# Patient Record
Sex: FEMALE | Race: OTHER | URBAN - METROPOLITAN AREA
[De-identification: names, ages, dates, MRNs, and addresses within clinical notes are randomized per-mention and may not be internally consistent; named-entity substitution may affect disease eponyms.]

---

## 2019-07-16 ENCOUNTER — OFFICE VISIT (OUTPATIENT)
Dept: FAMILY MEDICINE CLINIC | Facility: CLINIC | Age: 34
End: 2019-07-16

## 2019-07-16 DIAGNOSIS — Z11.1 SCREENING FOR TUBERCULOSIS: Primary | ICD-10-CM

## 2019-07-16 PROCEDURE — 86580 TB INTRADERMAL TEST: CPT | Performed by: NURSE PRACTITIONER

## 2019-07-17 NOTE — PATIENT INSTRUCTIONS
You will need to return to clinic in 48-72 hours to have results of TB test read. Please return to clinic on [place] 7/18/19 after 7pm or on [place] 7/19/19 before 7PM to have your TB test read.     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  If you test positive on a TB skin test, you will If you have been vaccinated against tuberculosis with Bacilli Calmette-Hayder (BCG), you can have a positive reaction to the skin test even if you never had a TB infection.   You might have a false negative test if you are:  · Malnourished  · On steroid the

## 2019-07-17 NOTE — PROGRESS NOTES
Jacob Ramsey is a 26year old female who presents for TB testing. TUBERCULOSIS SCREENING QUESTIONNAIRE    · Live vaccines in the past month? no  · Any steroid medication in the past month? no  · History of BCG vaccine?    no  · If female, are you

## 2019-07-19 ENCOUNTER — OFFICE VISIT (OUTPATIENT)
Dept: FAMILY MEDICINE CLINIC | Facility: CLINIC | Age: 34
End: 2019-07-19

## 2019-07-19 DIAGNOSIS — Z11.1 ENCOUNTER FOR PPD SKIN TEST READING: Primary | ICD-10-CM

## 2019-07-19 LAB — INDURATION (): 0 MM (ref 0–11)

## 2019-07-19 NOTE — PROGRESS NOTES
Pt. Presented for PPD read. PPD 0.0mm to LFA. Reviewed negative results with patient. Letter with results signed, stamped, and given to patient.

## 2019-07-24 ENCOUNTER — OFFICE VISIT (OUTPATIENT)
Dept: FAMILY MEDICINE CLINIC | Facility: CLINIC | Age: 34
End: 2019-07-24

## 2019-07-24 VITALS
WEIGHT: 115 LBS | SYSTOLIC BLOOD PRESSURE: 123 MMHG | OXYGEN SATURATION: 98 % | HEART RATE: 78 BPM | TEMPERATURE: 98 F | DIASTOLIC BLOOD PRESSURE: 70 MMHG

## 2019-07-24 DIAGNOSIS — K29.00 OTHER ACUTE GASTRITIS WITHOUT HEMORRHAGE: Primary | ICD-10-CM

## 2019-07-24 PROCEDURE — 99213 OFFICE O/P EST LOW 20 MIN: CPT

## 2019-07-25 NOTE — PROGRESS NOTES
Sharmaine Fofana is a 26year old female. CHIEF COMPLAINT:   Patient presents with:  Abdominal Pain: 1 day    HPI:   Patient presents with symptoms of stomach ache Reports 1 day history epigastric and also developing LLQ discomfort.  Has had this in the p SKIN: no rashes, no skin wounds or ulcers. GI: See HPI. HX of similar stress induced stomach pains. : See HPI. NEURO: no headaches.     EXAM:   /70   Pulse 78   Temp 98 °F (36.7 °C) (Oral)   Wt 115 lb   LMP 07/15/2019 (Approximate)   SpO2 98% · Vivienne infección con la bacteria H. pylori  · Consumo de tabaco  · Consumo de alcohol  · Otras afecciones, deena trastornos inmunitarios, determinados medicamentos (suplementos de francesco de dosis eleanor) y drogas ilegales (deena cocaína)  Los síntomas para ambo · No consuma tabaco. Tampoco consuma alcohol. Estos productos pueden aumentar la cantidad de ácido que produce [employer]. [place] puede retrasar la sanación. También puede [employer].   Atención de seguimiento  Asista a las citas de seguimiento co La gastritis es artis inflamación e irritación del recubrimiento del estómago. Puede ser reciente Giovani Soniya) o de samina plazo (crónica).  La infección por la bacteria H. pylori generalmente provoca gastritis. Más de un tercio de la población de [place] tiene est · Si le seay recetado medicamentos para tratar la infección por H. pylori, tómelos según lo que le hayan indicado.  [place] todo el medicamento hasta terminarlo o hasta que gibson proveedor de atención médica le diga que deje de [facility]; hágalo aunque se esté sinti

## 2019-07-25 NOTE — PATIENT INSTRUCTIONS
Gastritis o úlcera, sin tratamiento de antibióticos    La gastritis es la irritación e inflamación del recubrimiento del estómago. [place] significa que el recubrimiento se enrojece y se hincha.  [place] puede provocar úlceras superficiales en el recubrimiento · [place] cualquier [employer] que le hayan recetado exactamente deena le indicaron. Los medicamentos frecuentes que se usan para tratar la gastritis incluyen los siguientes:  ? Antiácidos. Estos ayudan a neutralizar los ácidos normales en el estómago. ?  Inh · Desmayos  · Grandes cantidades de jesus presentes en el vómito o las heces  Date Last Reviewed: 6/16/2015  © 4264-3541 [employer] [address]. [address] [facility] [place], [place], [address] [place] [address] [place]. Todos los derechos reservados.  Esta información no pretende sust Si es necesario, nuestro proveedor de atención médica puede recetarle medicamentos. Si sufre infección por H. Pylori, tratar la afección puede ayudar a [employer].  Existen otros cambios que también pueden ayudar a reducir la irritación estomacal © 9272-1270 [employer]. [address] [facility] [place], [address] [facility]. Todos los derechos reservados. Esta información no pretende sustituir la atención médica profesional. Sólo gibson médico puede diagnosticar y tratar un problema de shanice.

## (undated) NOTE — LETTER
Date: 7/24/2019    Patient Name: Neel Carter          To Whom it may concern: This letter has been written at the patient's request. The above patient was seen at the [facility] for treatment of a medical condition.     This patient

## (undated) NOTE — LETTER
July 19, 3230    Gayle Fay  [address]  [place] [address]      Dear Gayle: The following are the results of your recent TB results. Please review the list of test results.   Your result is the value on the left; we have also suppl